# Patient Record
Sex: MALE | Race: WHITE | Employment: FULL TIME | URBAN - METROPOLITAN AREA
[De-identification: names, ages, dates, MRNs, and addresses within clinical notes are randomized per-mention and may not be internally consistent; named-entity substitution may affect disease eponyms.]

---

## 2023-08-14 RX ORDER — METOPROLOL SUCCINATE 25 MG/1
25 TABLET, EXTENDED RELEASE ORAL DAILY
Qty: 90 TABLET | Refills: 0 | Status: SHIPPED | OUTPATIENT
Start: 2023-08-14

## 2023-08-14 NOTE — TELEPHONE ENCOUNTER
Patient's wife called to request a refill for the patient. Patient has an appt scheduled for 9/1. Metoprolol -25 mg  1 x daily  [facility] in [address] [place] [address]    Thank you.

## 2023-08-29 SDOH — ECONOMIC STABILITY: TRANSPORTATION INSECURITY
IN THE PAST 12 MONTHS, HAS LACK OF TRANSPORTATION KEPT YOU FROM MEETINGS, WORK, OR FROM GETTING THINGS NEEDED FOR DAILY LIVING?: NO

## 2023-08-29 SDOH — ECONOMIC STABILITY: FOOD INSECURITY: WITHIN THE PAST 12 MONTHS, YOU WORRIED THAT YOUR FOOD WOULD RUN OUT BEFORE YOU GOT MONEY TO BUY MORE.: NEVER TRUE

## 2023-08-29 SDOH — ECONOMIC STABILITY: FOOD INSECURITY: WITHIN THE PAST 12 MONTHS, THE FOOD YOU BOUGHT JUST DIDN'T LAST AND YOU DIDN'T HAVE MONEY TO GET MORE.: NEVER TRUE

## 2023-08-29 SDOH — ECONOMIC STABILITY: INCOME INSECURITY: HOW HARD IS IT FOR YOU TO PAY FOR THE VERY BASICS LIKE FOOD, HOUSING, MEDICAL CARE, AND HEATING?: NOT HARD AT ALL

## 2023-08-29 SDOH — ECONOMIC STABILITY: HOUSING INSECURITY
IN THE LAST 12 MONTHS, WAS THERE A TIME WHEN YOU DID NOT HAVE A STEADY PLACE TO SLEEP OR SLEPT IN A SHELTER (INCLUDING NOW)?: NO

## 2023-08-29 ASSESSMENT — PATIENT HEALTH QUESTIONNAIRE - PHQ9
SUM OF ALL RESPONSES TO PHQ QUESTIONS 1-9: 0
2. FEELING DOWN, DEPRESSED OR HOPELESS: 0
SUM OF ALL RESPONSES TO PHQ QUESTIONS 1-9: 0
SUM OF ALL RESPONSES TO PHQ QUESTIONS 1-9: 0
1. LITTLE INTEREST OR PLEASURE IN DOING THINGS: NOT AT ALL
SUM OF ALL RESPONSES TO PHQ9 QUESTIONS 1 & 2: 0
SUM OF ALL RESPONSES TO PHQ9 QUESTIONS 1 & 2: 0
2. FEELING DOWN, DEPRESSED OR HOPELESS: NOT AT ALL
1. LITTLE INTEREST OR PLEASURE IN DOING THINGS: 0
SUM OF ALL RESPONSES TO PHQ QUESTIONS 1-9: 0

## 2023-09-01 ENCOUNTER — OFFICE VISIT (OUTPATIENT)
Dept: FAMILY MEDICINE CLINIC | Age: 63
End: 2023-09-01
Payer: COMMERCIAL

## 2023-09-01 VITALS
WEIGHT: 270.8 LBS | HEIGHT: 71 IN | DIASTOLIC BLOOD PRESSURE: 88 MMHG | BODY MASS INDEX: 37.91 KG/M2 | SYSTOLIC BLOOD PRESSURE: 136 MMHG

## 2023-09-01 DIAGNOSIS — I10 PRIMARY HYPERTENSION: ICD-10-CM

## 2023-09-01 DIAGNOSIS — J30.2 SEASONAL ALLERGIC RHINITIS, UNSPECIFIED TRIGGER: ICD-10-CM

## 2023-09-01 DIAGNOSIS — R53.82 CHRONIC FATIGUE: ICD-10-CM

## 2023-09-01 DIAGNOSIS — Z00.00 ENCOUNTER FOR ROUTINE ADULT HEALTH EXAMINATION WITHOUT ABNORMAL FINDINGS: Primary | ICD-10-CM

## 2023-09-01 DIAGNOSIS — F51.12 INSUFFICIENT SLEEP SYNDROME: ICD-10-CM

## 2023-09-01 DIAGNOSIS — M79.672 LEFT FOOT PAIN: ICD-10-CM

## 2023-09-01 PROCEDURE — 3075F SYST BP GE 130 - 139MM HG: CPT | Performed by: FAMILY MEDICINE

## 2023-09-01 PROCEDURE — 99396 PREV VISIT EST AGE 40-64: CPT | Performed by: FAMILY MEDICINE

## 2023-09-01 PROCEDURE — 3079F DIAST BP 80-89 MM HG: CPT | Performed by: FAMILY MEDICINE

## 2023-09-01 RX ORDER — MODAFINIL 200 MG/1
200 TABLET ORAL DAILY
Qty: 90 TABLET | Refills: 0 | Status: SHIPPED | OUTPATIENT
Start: 2023-09-01 | End: 2023-11-30

## 2023-09-01 RX ORDER — LANOLIN ALCOHOL/MO/W.PET/CERES
CREAM (GRAM) TOPICAL
COMMUNITY

## 2023-09-01 NOTE — PROGRESS NOTES
Normocephalic and atraumatic. Right Ear: Tympanic membrane normal.      Left Ear: Tympanic membrane normal.      Nose: Nose normal.      Mouth/Throat:      Mouth: Mucous membranes are dry. Eyes:      Extraocular Movements: Extraocular movements intact. Conjunctiva/sclera: Conjunctivae normal.      Pupils: Pupils are equal, round, and reactive to light. Cardiovascular:      Pulses: Normal pulses. Heart sounds: Normal heart sounds. Pulmonary:      Effort: Pulmonary effort is normal.      Breath sounds: Normal breath sounds. Abdominal:      General: Abdomen is flat. Bowel sounds are normal.      Palpations: Abdomen is soft. Musculoskeletal:         General: Normal range of motion. Cervical back: Normal range of motion and neck supple. Skin:     General: Skin is warm and dry. Neurological:      General: No focal deficit present. Mental Status: He is alert. Comments: He has some tenderness in the area of the neuromas. Psychiatric:         Mood and Affect: Mood normal.         Behavior: Behavior normal.         Thought Content: Thought content normal.         Judgment: Judgment normal.     Assessment & Plan   1. Encounter for routine adult health examination without abnormal findings  2. Primary hypertension  3. Seasonal allergic rhinitis, unspecified trigger  4. Left foot pain  -     Amb External Referral To Podiatry  5. Chronic fatigue  6. Insufficient sleep syndrome  -     modafinil (PROVIGIL) 200 MG tablet; Take 1 tablet by mouth daily for 90 days.  Max Daily Amount: 200 mg, Disp-90 tablet, R-0Normal     Orders Placed This Encounter   Procedures    Amb External Referral To Podiatry     Referral Priority:   Routine     Referral Type:   Consult for Advice and Opinion     Referred to Provider:   Yumiko Silva DPM     Requested Specialty:   PODIATRIST FOOT AND ANKLE SURGERY     Number of Visits Requested:   1     Orders Placed This Encounter   Medications    modafinil

## 2023-11-20 RX ORDER — METOPROLOL SUCCINATE 25 MG/1
25 TABLET, EXTENDED RELEASE ORAL DAILY
Qty: 90 TABLET | Refills: 0 | Status: SHIPPED | OUTPATIENT
Start: 2023-11-20

## 2023-11-20 NOTE — TELEPHONE ENCOUNTER
Patient requesting medication refill. Please approve or deny this request.    Rx requested:  Requested Prescriptions     Pending Prescriptions Disp Refills    metoprolol succinate (TOPROL XL) 25 MG extended release tablet 90 tablet 0     Sig: Take 1 tablet by mouth daily         Last Office Visit:   9/1/2023      Next Visit Date:  No future appointments.

## 2024-01-31 ENCOUNTER — OFFICE VISIT (OUTPATIENT)
Dept: FAMILY MEDICINE CLINIC | Age: 64
End: 2024-01-31
Payer: COMMERCIAL

## 2024-01-31 VITALS
WEIGHT: 270 LBS | TEMPERATURE: 97.6 F | SYSTOLIC BLOOD PRESSURE: 138 MMHG | HEART RATE: 70 BPM | OXYGEN SATURATION: 99 % | DIASTOLIC BLOOD PRESSURE: 88 MMHG | HEIGHT: 71 IN | BODY MASS INDEX: 37.8 KG/M2

## 2024-01-31 DIAGNOSIS — I10 PRIMARY HYPERTENSION: Primary | ICD-10-CM

## 2024-01-31 DIAGNOSIS — J40 BRONCHITIS: ICD-10-CM

## 2024-01-31 PROCEDURE — G8427 DOCREV CUR MEDS BY ELIG CLIN: HCPCS | Performed by: FAMILY MEDICINE

## 2024-01-31 PROCEDURE — 99213 OFFICE O/P EST LOW 20 MIN: CPT | Performed by: FAMILY MEDICINE

## 2024-01-31 PROCEDURE — 3075F SYST BP GE 130 - 139MM HG: CPT | Performed by: FAMILY MEDICINE

## 2024-01-31 PROCEDURE — 3079F DIAST BP 80-89 MM HG: CPT | Performed by: FAMILY MEDICINE

## 2024-01-31 PROCEDURE — G8417 CALC BMI ABV UP PARAM F/U: HCPCS | Performed by: FAMILY MEDICINE

## 2024-01-31 PROCEDURE — G8484 FLU IMMUNIZE NO ADMIN: HCPCS | Performed by: FAMILY MEDICINE

## 2024-01-31 PROCEDURE — 3017F COLORECTAL CA SCREEN DOC REV: CPT | Performed by: FAMILY MEDICINE

## 2024-01-31 PROCEDURE — 1036F TOBACCO NON-USER: CPT | Performed by: FAMILY MEDICINE

## 2024-01-31 RX ORDER — DOXYCYCLINE HYCLATE 100 MG
100 TABLET ORAL 2 TIMES DAILY
Qty: 20 TABLET | Refills: 0 | Status: SHIPPED | OUTPATIENT
Start: 2024-01-31 | End: 2024-02-10

## 2024-01-31 RX ORDER — METHYLPREDNISOLONE 4 MG/1
TABLET ORAL
Qty: 1 KIT | Refills: 0 | Status: SHIPPED | OUTPATIENT
Start: 2024-01-31

## 2024-01-31 RX ORDER — LISINOPRIL 5 MG/1
5 TABLET ORAL DAILY
Qty: 30 TABLET | Refills: 5 | Status: SHIPPED | OUTPATIENT
Start: 2024-01-31

## 2024-01-31 ASSESSMENT — PATIENT HEALTH QUESTIONNAIRE - PHQ9
SUM OF ALL RESPONSES TO PHQ QUESTIONS 1-9: 0
SUM OF ALL RESPONSES TO PHQ9 QUESTIONS 1 & 2: 0
2. FEELING DOWN, DEPRESSED OR HOPELESS: 0
SUM OF ALL RESPONSES TO PHQ QUESTIONS 1-9: 0
1. LITTLE INTEREST OR PLEASURE IN DOING THINGS: 0
SUM OF ALL RESPONSES TO PHQ QUESTIONS 1-9: 0
SUM OF ALL RESPONSES TO PHQ QUESTIONS 1-9: 0

## 2024-01-31 ASSESSMENT — ENCOUNTER SYMPTOMS: COUGH: 1

## 2024-01-31 NOTE — PROGRESS NOTES
Subjective  Cade Ham 1960 is a 63 y.o. male who presents today with:  Chief Complaint   Patient presents with    Hypertension     Reports his BP has been elevated at home, averaging about 150/90. Denies chest pains, palpitations, SOB, dizziness, lightheadedness, headaches. Does have some swelling in his lower legs and feet. Currently taking metoprolol 25 mg once daily as directed, without adverse effects.     Cough     Reports he had COVID about 1 month ago and has lingering C/O sinus congestion, drainage, and hoarse voice. Denies fevers, chills, fatigue, body aches, headaches, facial pain, ear pain, sore throat, cough, SOB, wheezing, nausea, vomiting, or diarrhea. He has been taking OTC cold and flu medication for relief, which has helped give him temporary relief.      Other     C/O having to attempt to clear his throat often, but is unable to do so. Reports this has been ongoing for several years, but has gotten worse since COVID and is now feels like he cannot clear the mucus that is caught in his throat unless he consumes ice cream or gravy. Hx lymphoma. Denies feeling any lumps or swollen lymph nodes in/around his throat. Also has a hx of GERD. He takes Nexium only PRN, 2-3 times monthly. Denies coughing.     Hip Pain     C/O left hip pain. This has been a concern for a few months. Denies back or leg pain. He lays on his left hip at night and the pain will wake him up out of his sleep.        Hypertension    Cough    Hip Pain       I have reviewed HPI and agree with above.     Review of Systems   Respiratory:  Positive for cough.    All other systems reviewed and are negative.      Past Medical History:   Diagnosis Date    Cancer (HCC) 2021    Hypertension     Obesity      Past Surgical History:   Procedure Laterality Date    TONSILLECTOMY      UPPER GASTROINTESTINAL ENDOSCOPY       Social History     Socioeconomic History    Marital status:      Spouse name: Not on file    Number of

## 2024-02-12 ENCOUNTER — OFFICE VISIT (OUTPATIENT)
Dept: FAMILY MEDICINE CLINIC | Age: 64
End: 2024-02-12
Payer: COMMERCIAL

## 2024-02-12 VITALS
DIASTOLIC BLOOD PRESSURE: 80 MMHG | HEART RATE: 64 BPM | HEIGHT: 71 IN | BODY MASS INDEX: 39.23 KG/M2 | OXYGEN SATURATION: 98 % | TEMPERATURE: 97.6 F | SYSTOLIC BLOOD PRESSURE: 132 MMHG | WEIGHT: 280.2 LBS

## 2024-02-12 DIAGNOSIS — J30.89 NON-SEASONAL ALLERGIC RHINITIS DUE TO OTHER ALLERGIC TRIGGER: Primary | ICD-10-CM

## 2024-02-12 PROCEDURE — G8427 DOCREV CUR MEDS BY ELIG CLIN: HCPCS | Performed by: FAMILY MEDICINE

## 2024-02-12 PROCEDURE — 1036F TOBACCO NON-USER: CPT | Performed by: FAMILY MEDICINE

## 2024-02-12 PROCEDURE — 99213 OFFICE O/P EST LOW 20 MIN: CPT | Performed by: FAMILY MEDICINE

## 2024-02-12 PROCEDURE — G8417 CALC BMI ABV UP PARAM F/U: HCPCS | Performed by: FAMILY MEDICINE

## 2024-02-12 PROCEDURE — 96372 THER/PROPH/DIAG INJ SC/IM: CPT | Performed by: FAMILY MEDICINE

## 2024-02-12 PROCEDURE — G8484 FLU IMMUNIZE NO ADMIN: HCPCS | Performed by: FAMILY MEDICINE

## 2024-02-12 PROCEDURE — 3017F COLORECTAL CA SCREEN DOC REV: CPT | Performed by: FAMILY MEDICINE

## 2024-02-12 RX ORDER — TRIAMCINOLONE ACETONIDE 40 MG/ML
80 INJECTION, SUSPENSION INTRA-ARTICULAR; INTRAMUSCULAR ONCE
Status: COMPLETED | OUTPATIENT
Start: 2024-02-12 | End: 2024-02-12

## 2024-02-12 RX ADMIN — TRIAMCINOLONE ACETONIDE 80 MG: 40 INJECTION, SUSPENSION INTRA-ARTICULAR; INTRAMUSCULAR at 16:47

## 2024-02-12 NOTE — PROGRESS NOTES
\"LABA1C\"  No results found for: \"CREATININE\"  No results found for: \"ALT\", \"AST\"  No results found for: \"CHOL\", \"TRIG\", \"HDL\", \"LDLCALC\", \"LDLDIRECT\"       Physical Exam  Constitutional:       Appearance: Normal appearance.   Neurological:      Mental Status: He is alert.       Assessment & Plan   1. Non-seasonal allergic rhinitis due to other allergic trigger  -     triamcinolone acetonide (KENALOG-40) injection 80 mg; 80 mg, IntraMUSCular, ONCE, 1 dose, On Mon 2/12/24 at 1715  I suspect most of his problems are allergy mediated.  We will see how his responses to a Kenalog shot.  If the response is limited we will send him to allergy for testing  No orders of the defined types were placed in this encounter.    Orders Placed This Encounter   Medications    triamcinolone acetonide (KENALOG-40) injection 80 mg     Medications Discontinued During This Encounter   Medication Reason    methylPREDNISolone (MEDROL DOSEPACK) 4 MG tablet Therapy completed     No follow-ups on file.        Reviewed with the patient: current clinical status, medications, activities and diet.     Side effects, adverse effects of the medication prescribed today, as well as treatment plan/ rationale and result expectations have been discussed with the patient who expresses understanding and desires to proceed.    Close follow up to evaluate treatment results and for coordination of care.  I have reviewed the patient's medical history in detail and updated the computerized patient record.    JOEL CARPIO DO

## 2024-02-13 RX ORDER — METOPROLOL SUCCINATE 25 MG/1
25 TABLET, EXTENDED RELEASE ORAL DAILY
Qty: 90 TABLET | Refills: 0 | Status: SHIPPED | OUTPATIENT
Start: 2024-02-13

## 2024-02-13 NOTE — TELEPHONE ENCOUNTER
Future Appointments    This patient does not currently have any appointments scheduled.  Past Visits    Date Provider Specialty Visit Type Primary Dx   02/12/2024 Gerald Gamboa, DO Family Medicine Office Visit Non-seasonal allergic rhinitis due to other allergic trigger

## 2024-04-29 RX ORDER — METOPROLOL SUCCINATE 25 MG/1
25 TABLET, EXTENDED RELEASE ORAL DAILY
Qty: 90 TABLET | Refills: 0 | Status: SHIPPED | OUTPATIENT
Start: 2024-04-29

## 2024-04-29 NOTE — TELEPHONE ENCOUNTER
Please approve or deny request. Thank you!    Rx requested:  Requested Prescriptions     Pending Prescriptions Disp Refills    metoprolol succinate (TOPROL XL) 25 MG extended release tablet 90 tablet 0     Sig: Take 1 tablet by mouth daily         Last Office Visit:   2/12/2024      Next Visit Date:  No future appointments.

## 2024-06-06 ENCOUNTER — OFFICE VISIT (OUTPATIENT)
Dept: FAMILY MEDICINE CLINIC | Age: 64
End: 2024-06-06
Payer: COMMERCIAL

## 2024-06-06 VITALS
TEMPERATURE: 97.5 F | HEIGHT: 71 IN | WEIGHT: 278 LBS | OXYGEN SATURATION: 96 % | SYSTOLIC BLOOD PRESSURE: 136 MMHG | DIASTOLIC BLOOD PRESSURE: 84 MMHG | BODY MASS INDEX: 38.92 KG/M2 | HEART RATE: 67 BPM

## 2024-06-06 DIAGNOSIS — K43.9 ABDOMINAL WALL HERNIA: ICD-10-CM

## 2024-06-06 DIAGNOSIS — T81.89XD NON-HEALING SURGICAL WOUND, SUBSEQUENT ENCOUNTER: ICD-10-CM

## 2024-06-06 PROCEDURE — G8417 CALC BMI ABV UP PARAM F/U: HCPCS | Performed by: FAMILY MEDICINE

## 2024-06-06 PROCEDURE — G8427 DOCREV CUR MEDS BY ELIG CLIN: HCPCS | Performed by: FAMILY MEDICINE

## 2024-06-06 PROCEDURE — 3017F COLORECTAL CA SCREEN DOC REV: CPT | Performed by: FAMILY MEDICINE

## 2024-06-06 PROCEDURE — 1036F TOBACCO NON-USER: CPT | Performed by: FAMILY MEDICINE

## 2024-06-06 PROCEDURE — 99213 OFFICE O/P EST LOW 20 MIN: CPT | Performed by: FAMILY MEDICINE

## 2024-06-06 RX ORDER — PHENTERMINE HYDROCHLORIDE 37.5 MG/1
37.5 TABLET ORAL
Qty: 30 TABLET | Refills: 0 | Status: SHIPPED | OUTPATIENT
Start: 2024-06-06 | End: 2024-07-06

## 2024-06-06 ASSESSMENT — ENCOUNTER SYMPTOMS: ABDOMINAL PAIN: 1

## 2024-06-06 NOTE — PROGRESS NOTES
Social Determinants of Health     Financial Resource Strain: Low Risk  (8/29/2023)    Overall Financial Resource Strain (CARDIA)     Difficulty of Paying Living Expenses: Not hard at all   Food Insecurity: Not on file (8/29/2023)   Transportation Needs: Unknown (8/29/2023)    PRAPARE - Transportation     Lack of Transportation (Medical): Not on file     Lack of Transportation (Non-Medical): No   Physical Activity: Not on file   Stress: Not on file   Social Connections: Not on file   Intimate Partner Violence: Not on file   Housing Stability: Unknown (8/29/2023)    Housing Stability Vital Sign     Unable to Pay for Housing in the Last Year: Not on file     Number of Places Lived in the Last Year: Not on file     Unstable Housing in the Last Year: No     Family History   Problem Relation Age of Onset    High Blood Pressure Mother     High Cholesterol Mother     Cancer Father     Mental Illness Father     Obesity Paternal Grandfather     Obesity Paternal Grandmother      Allergies   Allergen Reactions    Adhesive Tape Rash    Cephalosporins Rash    Heparin Rash     Current Outpatient Medications   Medication Sig Dispense Refill    mupirocin (BACTROBAN) 2 % ointment Apply to wound daily until healed      phentermine (ADIPEX-P) 37.5 MG tablet Take 1 tablet by mouth every morning (before breakfast) for 30 days. Max Daily Amount: 37.5 mg 30 tablet 0    metoprolol succinate (TOPROL XL) 25 MG extended release tablet Take 1 tablet by mouth daily 90 tablet 0    lisinopril (PRINIVIL;ZESTRIL) 5 MG tablet Take 1 tablet by mouth daily 30 tablet 5    esomeprazole (NEXIUM) 20 MG delayed release capsule       Cyanocobalamin (VITAMIN B12) 500 MCG TABS        No current facility-administered medications for this visit.       PMH, Surgical Hx, Family Hx, and Social Hx reviewed and updated.  Health Maintenance reviewed.    Objective  Vitals:    06/06/24 1313   BP: 136/84   Pulse: 67   Temp: 97.5 °F (36.4 °C)   TempSrc: Temporal

## 2024-06-25 ENCOUNTER — LAB REQUISITION (OUTPATIENT)
Dept: LAB | Facility: HOSPITAL | Age: 64
End: 2024-06-25
Payer: COMMERCIAL

## 2024-06-25 ENCOUNTER — OFFICE VISIT (OUTPATIENT)
Dept: WOUND CARE | Facility: CLINIC | Age: 64
End: 2024-06-25
Payer: COMMERCIAL
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
## 2024-06-25 DIAGNOSIS — T81.31XA DISRUPTION OF EXTERNAL OPERATION (SURGICAL) WOUND, NOT ELSEWHERE CLASSIFIED, INITIAL ENCOUNTER: ICD-10-CM

## 2024-06-25 DIAGNOSIS — L98.492 NON-PRESSURE CHRONIC ULCER OF SKIN OF OTHER SITES WITH FAT LAYER EXPOSED (MULTI): ICD-10-CM

## 2024-06-25 PROCEDURE — 99213 OFFICE O/P EST LOW 20 MIN: CPT | Mod: 25

## 2024-06-25 PROCEDURE — 99213 OFFICE O/P EST LOW 20 MIN: CPT | Performed by: SURGERY

## 2024-06-25 PROCEDURE — 87070 CULTURE OTHR SPECIMN AEROBIC: CPT | Mod: OUT,ELYLAB | Performed by: SURGERY

## 2024-06-25 PROCEDURE — 11042 DBRDMT SUBQ TIS 1ST 20SQCM/<: CPT

## 2024-06-25 PROCEDURE — 11042 DBRDMT SUBQ TIS 1ST 20SQCM/<: CPT | Performed by: SURGERY

## 2024-06-28 LAB
B-LACTAMASE ORGANISM ISLT: POSITIVE
BACTERIA SPEC CULT: ABNORMAL
BACTERIA SPEC CULT: ABNORMAL
GRAM STN SPEC: ABNORMAL
GRAM STN SPEC: ABNORMAL

## 2024-07-01 ENCOUNTER — TELEPHONE (OUTPATIENT)
Dept: FAMILY MEDICINE CLINIC | Age: 64
End: 2024-07-01

## 2024-07-01 NOTE — TELEPHONE ENCOUNTER
Flaca from [facility] called and stated that patient is being seen by  wound clinic.  She stated she is going out Monday, Wednesday and Friday to change the bandage for the wound vac.

## 2024-07-02 ENCOUNTER — LAB (OUTPATIENT)
Dept: LAB | Facility: LAB | Age: 64
End: 2024-07-02
Payer: COMMERCIAL

## 2024-07-02 ENCOUNTER — OFFICE VISIT (OUTPATIENT)
Dept: WOUND CARE | Facility: CLINIC | Age: 64
End: 2024-07-02
Payer: COMMERCIAL
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
## 2024-07-02 LAB
CREAT SERPL-MCNC: 0.99 MG/DL (ref 0.5–1.3)
EGFRCR SERPLBLD CKD-EPI 2021: 86 ML/MIN/1.73M*2

## 2024-07-02 PROCEDURE — 11042 DBRDMT SUBQ TIS 1ST 20SQCM/<: CPT

## 2024-07-02 PROCEDURE — 11042 DBRDMT SUBQ TIS 1ST 20SQCM/<: CPT | Performed by: SURGERY

## 2024-07-09 ENCOUNTER — OFFICE VISIT (OUTPATIENT)
Dept: WOUND CARE | Facility: CLINIC | Age: 64
End: 2024-07-09
Payer: COMMERCIAL

## 2024-07-09 ENCOUNTER — HOSPITAL ENCOUNTER (OUTPATIENT)
Dept: RADIOLOGY | Facility: HOSPITAL | Age: 64
Discharge: HOME | End: 2024-07-09
Payer: COMMERCIAL

## 2024-07-09 DIAGNOSIS — L98.492 NON-PRESSURE CHRONIC ULCER OF SKIN OF OTHER SITES WITH FAT LAYER EXPOSED (MULTI): ICD-10-CM

## 2024-07-09 DIAGNOSIS — T81.31XA DISRUPTION OF EXTERNAL OPERATION (SURGICAL) WOUND, NOT ELSEWHERE CLASSIFIED, INITIAL ENCOUNTER: ICD-10-CM

## 2024-07-09 PROCEDURE — 2550000001 HC RX 255 CONTRASTS: Performed by: SURGERY

## 2024-07-09 PROCEDURE — 74177 CT ABD & PELVIS W/CONTRAST: CPT | Performed by: RADIOLOGY

## 2024-07-09 PROCEDURE — 71260 CT THORAX DX C+: CPT | Performed by: RADIOLOGY

## 2024-07-09 PROCEDURE — A9698 NON-RAD CONTRAST MATERIALNOC: HCPCS | Performed by: SURGERY

## 2024-07-09 PROCEDURE — 11042 DBRDMT SUBQ TIS 1ST 20SQCM/<: CPT

## 2024-07-09 PROCEDURE — 11042 DBRDMT SUBQ TIS 1ST 20SQCM/<: CPT | Performed by: SURGERY

## 2024-07-09 PROCEDURE — 71260 CT THORAX DX C+: CPT

## 2024-07-15 ENCOUNTER — OFFICE VISIT (OUTPATIENT)
Dept: FAMILY MEDICINE CLINIC | Age: 64
End: 2024-07-15
Payer: COMMERCIAL

## 2024-07-15 VITALS
HEIGHT: 71 IN | BODY MASS INDEX: 38.05 KG/M2 | HEART RATE: 58 BPM | DIASTOLIC BLOOD PRESSURE: 70 MMHG | OXYGEN SATURATION: 98 % | TEMPERATURE: 97.7 F | WEIGHT: 271.8 LBS | SYSTOLIC BLOOD PRESSURE: 122 MMHG

## 2024-07-15 DIAGNOSIS — R39.11 URINARY HESITANCY: ICD-10-CM

## 2024-07-15 DIAGNOSIS — T81.89XD NON-HEALING SURGICAL WOUND, SUBSEQUENT ENCOUNTER: ICD-10-CM

## 2024-07-15 PROCEDURE — G8417 CALC BMI ABV UP PARAM F/U: HCPCS | Performed by: FAMILY MEDICINE

## 2024-07-15 PROCEDURE — 99213 OFFICE O/P EST LOW 20 MIN: CPT | Performed by: FAMILY MEDICINE

## 2024-07-15 PROCEDURE — 1036F TOBACCO NON-USER: CPT | Performed by: FAMILY MEDICINE

## 2024-07-15 PROCEDURE — 3017F COLORECTAL CA SCREEN DOC REV: CPT | Performed by: FAMILY MEDICINE

## 2024-07-15 PROCEDURE — G8427 DOCREV CUR MEDS BY ELIG CLIN: HCPCS | Performed by: FAMILY MEDICINE

## 2024-07-15 RX ORDER — TAMSULOSIN HYDROCHLORIDE 0.4 MG/1
0.4 CAPSULE ORAL DAILY
Qty: 30 CAPSULE | Refills: 2 | Status: SHIPPED | OUTPATIENT
Start: 2024-07-15

## 2024-07-15 RX ORDER — TAMSULOSIN HYDROCHLORIDE 0.4 MG/1
0.4 CAPSULE ORAL DAILY
COMMUNITY
End: 2024-07-15 | Stop reason: SDUPTHER

## 2024-07-15 RX ORDER — PHENTERMINE HYDROCHLORIDE 37.5 MG/1
37.5 TABLET ORAL
Qty: 30 TABLET | Refills: 0 | Status: SHIPPED | OUTPATIENT
Start: 2024-07-15 | End: 2024-08-14

## 2024-07-15 NOTE — PROGRESS NOTES
37.5 mg     Dispense:  30 tablet     Refill:  0    tamsulosin (FLOMAX) 0.4 MG capsule     Sig: Take 1 capsule by mouth daily     Dispense:  30 capsule     Refill:  2     Medications Discontinued During This Encounter   Medication Reason    mupirocin (BACTROBAN) 2 % ointment LIST CLEANUP    tamsulosin (FLOMAX) 0.4 MG capsule REORDER     No follow-ups on file.        Reviewed with the patient: current clinical status, medications, activities and diet.     Side effects, adverse effects of the medication prescribed today, as well as treatment plan/ rationale and result expectations have been discussed with the patient who expresses understanding and desires to proceed.    Close follow up to evaluate treatment results and for coordination of care.  I have reviewed the patient's medical history in detail and updated the computerized patient record.    JOEL CARPIO, DO

## 2024-07-16 ENCOUNTER — OFFICE VISIT (OUTPATIENT)
Dept: WOUND CARE | Facility: CLINIC | Age: 64
End: 2024-07-16
Payer: COMMERCIAL

## 2024-07-16 PROCEDURE — 11042 DBRDMT SUBQ TIS 1ST 20SQCM/<: CPT | Performed by: SURGERY

## 2024-07-16 PROCEDURE — 11042 DBRDMT SUBQ TIS 1ST 20SQCM/<: CPT

## 2024-07-21 DIAGNOSIS — I10 PRIMARY HYPERTENSION: ICD-10-CM

## 2024-07-22 RX ORDER — LISINOPRIL 5 MG/1
5 TABLET ORAL DAILY
Qty: 90 TABLET | Refills: 1 | Status: SHIPPED | OUTPATIENT
Start: 2024-07-22

## 2024-07-22 NOTE — TELEPHONE ENCOUNTER
Future Appointments    Encounter Information   Provider Department Appt Notes   8/15/2024 Gerald Gamboa,  [facility] 1 month follow up     Past Visits    Date Provider Specialty Visit Type Primary Dx   07/15/2024 Gerald Gamboa,  Family Medicine Office Visit BMI 37.0-37.9, adult

## 2024-07-23 ENCOUNTER — OFFICE VISIT (OUTPATIENT)
Dept: WOUND CARE | Facility: CLINIC | Age: 64
End: 2024-07-23
Payer: COMMERCIAL

## 2024-07-23 PROCEDURE — 11042 DBRDMT SUBQ TIS 1ST 20SQCM/<: CPT

## 2024-07-23 PROCEDURE — 11042 DBRDMT SUBQ TIS 1ST 20SQCM/<: CPT | Performed by: SURGERY

## 2024-07-29 RX ORDER — METOPROLOL SUCCINATE 25 MG/1
25 TABLET, EXTENDED RELEASE ORAL DAILY
Qty: 90 TABLET | Refills: 1 | Status: SHIPPED | OUTPATIENT
Start: 2024-07-29

## 2024-07-29 NOTE — TELEPHONE ENCOUNTER
Future Appointments    Encounter Information   Provider Department Appt Notes   8/15/2024 Gerald Gamboa DO [facility] 1 month follow up     Past Visits    Date Provider Specialty Visit Type Primary Dx   07/15/2024 Gerald Gamboa,  Family Medicine Office Visit BMI 37.0-37.9, adul

## 2024-07-30 ENCOUNTER — OFFICE VISIT (OUTPATIENT)
Dept: WOUND CARE | Facility: CLINIC | Age: 64
End: 2024-07-30
Payer: COMMERCIAL

## 2024-07-30 PROCEDURE — 11042 DBRDMT SUBQ TIS 1ST 20SQCM/<: CPT

## 2024-07-30 PROCEDURE — 11042 DBRDMT SUBQ TIS 1ST 20SQCM/<: CPT | Performed by: SURGERY

## 2024-08-06 ENCOUNTER — OFFICE VISIT (OUTPATIENT)
Dept: WOUND CARE | Facility: CLINIC | Age: 64
End: 2024-08-06
Payer: COMMERCIAL

## 2024-08-06 PROCEDURE — 11042 DBRDMT SUBQ TIS 1ST 20SQCM/<: CPT | Performed by: SURGERY

## 2024-08-06 PROCEDURE — 11045 DBRDMT SUBQ TISS EACH ADDL: CPT | Performed by: SURGERY

## 2024-08-06 PROCEDURE — 11045 DBRDMT SUBQ TISS EACH ADDL: CPT

## 2024-08-06 PROCEDURE — 11042 DBRDMT SUBQ TIS 1ST 20SQCM/<: CPT

## 2024-08-11 DIAGNOSIS — R39.11 URINARY HESITANCY: ICD-10-CM

## 2024-08-12 RX ORDER — TAMSULOSIN HYDROCHLORIDE 0.4 MG/1
0.4 CAPSULE ORAL DAILY
Qty: 90 CAPSULE | Refills: 3 | Status: SHIPPED | OUTPATIENT
Start: 2024-08-12

## 2024-08-12 RX ORDER — PHENTERMINE HYDROCHLORIDE 37.5 MG/1
37.5 TABLET ORAL
Qty: 30 TABLET | Refills: 0 | Status: SHIPPED | OUTPATIENT
Start: 2024-08-12 | End: 2024-09-11

## 2024-08-13 ENCOUNTER — OFFICE VISIT (OUTPATIENT)
Dept: WOUND CARE | Facility: CLINIC | Age: 64
End: 2024-08-13
Payer: COMMERCIAL

## 2024-08-13 PROCEDURE — 11042 DBRDMT SUBQ TIS 1ST 20SQCM/<: CPT | Performed by: SURGERY

## 2024-08-13 PROCEDURE — 11042 DBRDMT SUBQ TIS 1ST 20SQCM/<: CPT

## 2024-08-13 PROCEDURE — 11045 DBRDMT SUBQ TISS EACH ADDL: CPT | Performed by: SURGERY

## 2024-08-13 PROCEDURE — 11045 DBRDMT SUBQ TISS EACH ADDL: CPT

## 2024-08-15 ENCOUNTER — OFFICE VISIT (OUTPATIENT)
Dept: FAMILY MEDICINE CLINIC | Age: 64
End: 2024-08-15
Payer: COMMERCIAL

## 2024-08-15 VITALS
WEIGHT: 266.6 LBS | DIASTOLIC BLOOD PRESSURE: 76 MMHG | HEART RATE: 67 BPM | SYSTOLIC BLOOD PRESSURE: 122 MMHG | TEMPERATURE: 97.6 F | BODY MASS INDEX: 37.18 KG/M2 | OXYGEN SATURATION: 97 %

## 2024-08-15 DIAGNOSIS — T81.89XD NON-HEALING SURGICAL WOUND, SUBSEQUENT ENCOUNTER: ICD-10-CM

## 2024-08-15 PROCEDURE — G8417 CALC BMI ABV UP PARAM F/U: HCPCS | Performed by: FAMILY MEDICINE

## 2024-08-15 PROCEDURE — 99213 OFFICE O/P EST LOW 20 MIN: CPT | Performed by: FAMILY MEDICINE

## 2024-08-15 PROCEDURE — 1036F TOBACCO NON-USER: CPT | Performed by: FAMILY MEDICINE

## 2024-08-15 PROCEDURE — G8427 DOCREV CUR MEDS BY ELIG CLIN: HCPCS | Performed by: FAMILY MEDICINE

## 2024-08-15 PROCEDURE — 3017F COLORECTAL CA SCREEN DOC REV: CPT | Performed by: FAMILY MEDICINE

## 2024-08-15 NOTE — PROGRESS NOTES
Subjective  Cade Ham 1960 is a 64 y.o. male who presents today with:  Chief Complaint   Patient presents with    Weight Management     1 month follow up. This will start his third month on Adipex. Denies heart palpitations, chest pains, headaches, constipation, or trouble sleeping. He is down 5lbs since last visit and 12lbs total since starting the medication.     Wound Infection     He would like to discuss a second opinion on his wound.       Weight Management      I have reviewed HPI and agree with above.     Review of Systems   All other systems reviewed and are negative.      Past Medical History:   Diagnosis Date    Cancer (HCC) 2021    Hypertension     Obesity      Past Surgical History:   Procedure Laterality Date    TONSILLECTOMY      UPPER GASTROINTESTINAL ENDOSCOPY       Social History     Socioeconomic History    Marital status:      Spouse name: Not on file    Number of children: Not on file    Years of education: Not on file    Highest education level: Not on file   Occupational History    Not on file   Tobacco Use    Smoking status: Never    Smokeless tobacco: Never   Vaping Use    Vaping status: Never Used   Substance and Sexual Activity    Alcohol use: Yes     Alcohol/week: 1.0 standard drink of alcohol     Types: 1 Cans of beer per week     Comment: Week    Drug use: Never    Sexual activity: Yes     Partners: Female   Other Topics Concern    Not on file   Social History Narrative    Not on file     Social Determinants of Health     Financial Resource Strain: Low Risk  (8/29/2023)    Overall Financial Resource Strain (CARDIA)     Difficulty of Paying Living Expenses: Not hard at all   Food Insecurity: Not on file (8/29/2023)   Transportation Needs: Unknown (8/29/2023)    PRAPARE - Transportation     Lack of Transportation (Medical): Not on file     Lack of Transportation (Non-Medical): No   Physical Activity: Not on file   Stress: Not on file   Social Connections: Not on file

## 2024-08-27 ENCOUNTER — OFFICE VISIT (OUTPATIENT)
Dept: WOUND CARE | Facility: CLINIC | Age: 64
End: 2024-08-27
Payer: COMMERCIAL

## 2024-08-27 PROCEDURE — 11045 DBRDMT SUBQ TISS EACH ADDL: CPT

## 2024-08-27 PROCEDURE — 11042 DBRDMT SUBQ TIS 1ST 20SQCM/<: CPT | Performed by: SURGERY

## 2024-08-27 PROCEDURE — 11045 DBRDMT SUBQ TISS EACH ADDL: CPT | Performed by: SURGERY

## 2024-08-27 PROCEDURE — 11042 DBRDMT SUBQ TIS 1ST 20SQCM/<: CPT

## 2024-09-03 ENCOUNTER — OFFICE VISIT (OUTPATIENT)
Dept: WOUND CARE | Facility: CLINIC | Age: 64
End: 2024-09-03
Payer: COMMERCIAL

## 2024-09-03 PROCEDURE — 11045 DBRDMT SUBQ TISS EACH ADDL: CPT

## 2024-09-03 PROCEDURE — 11042 DBRDMT SUBQ TIS 1ST 20SQCM/<: CPT | Performed by: SURGERY

## 2024-09-03 PROCEDURE — 11042 DBRDMT SUBQ TIS 1ST 20SQCM/<: CPT

## 2024-09-03 PROCEDURE — 11045 DBRDMT SUBQ TISS EACH ADDL: CPT | Performed by: SURGERY

## 2024-09-10 ENCOUNTER — OFFICE VISIT (OUTPATIENT)
Dept: WOUND CARE | Facility: CLINIC | Age: 64
End: 2024-09-10
Payer: COMMERCIAL

## 2024-09-10 PROCEDURE — 11045 DBRDMT SUBQ TISS EACH ADDL: CPT | Performed by: SURGERY

## 2024-09-10 PROCEDURE — 11045 DBRDMT SUBQ TISS EACH ADDL: CPT

## 2024-09-10 PROCEDURE — 11042 DBRDMT SUBQ TIS 1ST 20SQCM/<: CPT

## 2024-09-10 PROCEDURE — 11042 DBRDMT SUBQ TIS 1ST 20SQCM/<: CPT | Performed by: SURGERY

## 2024-09-24 ENCOUNTER — OFFICE VISIT (OUTPATIENT)
Dept: WOUND CARE | Facility: CLINIC | Age: 64
End: 2024-09-24
Payer: COMMERCIAL

## 2024-09-24 PROCEDURE — 11042 DBRDMT SUBQ TIS 1ST 20SQCM/<: CPT

## 2024-09-24 PROCEDURE — 11042 DBRDMT SUBQ TIS 1ST 20SQCM/<: CPT | Performed by: SURGERY

## 2024-09-24 PROCEDURE — 11045 DBRDMT SUBQ TISS EACH ADDL: CPT

## 2024-10-01 ENCOUNTER — OFFICE VISIT (OUTPATIENT)
Dept: WOUND CARE | Facility: CLINIC | Age: 64
End: 2024-10-01
Payer: COMMERCIAL

## 2024-10-01 PROCEDURE — 11045 DBRDMT SUBQ TISS EACH ADDL: CPT

## 2024-10-01 PROCEDURE — 11042 DBRDMT SUBQ TIS 1ST 20SQCM/<: CPT

## 2024-10-01 PROCEDURE — 11042 DBRDMT SUBQ TIS 1ST 20SQCM/<: CPT | Performed by: SURGERY

## 2024-10-03 ENCOUNTER — PATIENT MESSAGE (OUTPATIENT)
Dept: FAMILY MEDICINE CLINIC | Age: 64
End: 2024-10-03

## 2024-10-03 DIAGNOSIS — R10.9 RECURRING ABDOMINAL PAIN: Primary | ICD-10-CM

## 2024-10-08 ENCOUNTER — OFFICE VISIT (OUTPATIENT)
Dept: WOUND CARE | Facility: CLINIC | Age: 64
End: 2024-10-08
Payer: COMMERCIAL

## 2024-10-08 PROCEDURE — 11042 DBRDMT SUBQ TIS 1ST 20SQCM/<: CPT

## 2024-10-08 PROCEDURE — 11045 DBRDMT SUBQ TISS EACH ADDL: CPT | Performed by: SURGERY

## 2024-10-08 PROCEDURE — 11042 DBRDMT SUBQ TIS 1ST 20SQCM/<: CPT | Performed by: SURGERY

## 2024-10-08 PROCEDURE — 11045 DBRDMT SUBQ TISS EACH ADDL: CPT

## 2024-10-15 ENCOUNTER — OFFICE VISIT (OUTPATIENT)
Dept: WOUND CARE | Facility: CLINIC | Age: 64
End: 2024-10-15
Payer: COMMERCIAL

## 2024-10-15 PROCEDURE — 11042 DBRDMT SUBQ TIS 1ST 20SQCM/<: CPT

## 2024-10-15 PROCEDURE — 11045 DBRDMT SUBQ TISS EACH ADDL: CPT

## 2024-10-16 RX ORDER — PHENTERMINE HYDROCHLORIDE 37.5 MG/1
37.5 TABLET ORAL
Qty: 30 TABLET | Refills: 0 | OUTPATIENT
Start: 2024-10-16 | End: 2024-11-15

## 2024-10-16 NOTE — TELEPHONE ENCOUNTER
Future Appointments    Encounter Information   Provider Department Appt Notes   11/15/2024 Gerald Gamboa,  [facility] 3 month follow up     Past Visits    Date Provider Specialty Visit Type Primary Dx   08/15/2024 Gerald Gamboa,  Family Medicine Office Visit BMI 37.0-37.9, adult

## 2024-10-22 ENCOUNTER — OFFICE VISIT (OUTPATIENT)
Dept: WOUND CARE | Facility: CLINIC | Age: 64
End: 2024-10-22
Payer: COMMERCIAL

## 2024-10-22 PROCEDURE — 11042 DBRDMT SUBQ TIS 1ST 20SQCM/<: CPT | Performed by: SURGERY

## 2024-10-22 PROCEDURE — 11042 DBRDMT SUBQ TIS 1ST 20SQCM/<: CPT

## 2024-10-29 ENCOUNTER — APPOINTMENT (OUTPATIENT)
Dept: WOUND CARE | Facility: CLINIC | Age: 64
End: 2024-10-29
Payer: COMMERCIAL

## 2024-11-05 ENCOUNTER — OFFICE VISIT (OUTPATIENT)
Dept: WOUND CARE | Facility: CLINIC | Age: 64
End: 2024-11-05
Payer: COMMERCIAL

## 2024-11-05 PROCEDURE — 11042 DBRDMT SUBQ TIS 1ST 20SQCM/<: CPT | Performed by: SURGERY

## 2024-11-05 PROCEDURE — 11042 DBRDMT SUBQ TIS 1ST 20SQCM/<: CPT

## 2024-11-12 ENCOUNTER — OFFICE VISIT (OUTPATIENT)
Dept: WOUND CARE | Facility: CLINIC | Age: 64
End: 2024-11-12
Payer: COMMERCIAL

## 2024-11-12 PROCEDURE — 11042 DBRDMT SUBQ TIS 1ST 20SQCM/<: CPT | Performed by: SURGERY

## 2024-11-12 PROCEDURE — 11042 DBRDMT SUBQ TIS 1ST 20SQCM/<: CPT

## 2024-11-12 SDOH — ECONOMIC STABILITY: FOOD INSECURITY: WITHIN THE PAST 12 MONTHS, YOU WORRIED THAT YOUR FOOD WOULD RUN OUT BEFORE YOU GOT MONEY TO BUY MORE.: NEVER TRUE

## 2024-11-12 SDOH — ECONOMIC STABILITY: INCOME INSECURITY: HOW HARD IS IT FOR YOU TO PAY FOR THE VERY BASICS LIKE FOOD, HOUSING, MEDICAL CARE, AND HEATING?: NOT HARD AT ALL

## 2024-11-12 SDOH — ECONOMIC STABILITY: FOOD INSECURITY: WITHIN THE PAST 12 MONTHS, THE FOOD YOU BOUGHT JUST DIDN'T LAST AND YOU DIDN'T HAVE MONEY TO GET MORE.: NEVER TRUE

## 2024-11-15 ENCOUNTER — OFFICE VISIT (OUTPATIENT)
Dept: FAMILY MEDICINE CLINIC | Age: 64
End: 2024-11-15

## 2024-11-15 VITALS
OXYGEN SATURATION: 98 % | HEART RATE: 64 BPM | SYSTOLIC BLOOD PRESSURE: 136 MMHG | HEIGHT: 71 IN | WEIGHT: 274.4 LBS | BODY MASS INDEX: 38.42 KG/M2 | DIASTOLIC BLOOD PRESSURE: 80 MMHG | TEMPERATURE: 97.6 F

## 2024-11-15 DIAGNOSIS — I10 PRIMARY HYPERTENSION: ICD-10-CM

## 2024-11-15 DIAGNOSIS — N40.0 BENIGN PROSTATIC HYPERPLASIA WITHOUT LOWER URINARY TRACT SYMPTOMS: ICD-10-CM

## 2024-11-15 RX ORDER — ALUMINUM ZIRCONIUM OCTACHLOROHYDREX GLY 16 G/100G
1 GEL TOPICAL DAILY
COMMUNITY
Start: 2024-10-20

## 2024-11-15 RX ORDER — PHENTERMINE HYDROCHLORIDE 37.5 MG/1
37.5 TABLET ORAL
Qty: 30 TABLET | Refills: 0 | Status: SHIPPED | OUTPATIENT
Start: 2024-11-15 | End: 2024-12-15

## 2024-11-15 RX ORDER — TADALAFIL 10 MG/1
10 TABLET ORAL DAILY
Qty: 30 TABLET | Refills: 2 | Status: SHIPPED | OUTPATIENT
Start: 2024-11-15

## 2024-11-15 NOTE — PROGRESS NOTES
Cade Ham (:  1960) is a 64 y.o. male, Established patient, here for evaluation of the following chief complaint(s):  Weight Management          Subjective   History of Present Illness  The patient presents for evaluation of multiple medical concerns.    He has been inconsistent with his diet and discontinued phentermine in 2024, resulting in a weight gain of 9 pounds. Despite his belief that the medication was not significantly beneficial, he acknowledges a lack of control without it. His physical activity is limited, although he previously attended the gym regularly. He has been consuming protein shakes and reports a decrease in wound size. He is considering resuming phentermine as it initially suppressed his appetite.    He experienced daily pain for three consecutive weeks, which required him to lie down. He consulted Dr. Patel, who prescribed an antibiotic. Since then, he has not experienced any episodes of pain. He is currently taking a probiotic and has been pain-free for the past three months. He suspects a hernia as the cause of his symptoms, which include burping and pain. He is interested in trying a medication to avoid surgery.    He has been monitoring his blood pressure at home, which was recorded as 140/81 yesterday. During a visit to the wound clinic, his blood pressure was 165/97. He is concerned about his blood pressure readings, which are consistently high, and fears they may increase further.    He is experiencing erectile dysfunction and is unsure if it is due to his age or medication. This issue began a year ago when his wife started chemotherapy. Prior to this, he had no problems and was able to engage in sexual activity twice a week. He is considering starting an exercise regimen to improve his strength.    He is currently taking Flomax and has resumed using Flonase, which he finds effective. He previously used Flonase for several months and found it helpful in reducing

## 2024-11-19 ENCOUNTER — OFFICE VISIT (OUTPATIENT)
Dept: WOUND CARE | Facility: CLINIC | Age: 64
End: 2024-11-19
Payer: COMMERCIAL

## 2024-11-19 PROCEDURE — 11042 DBRDMT SUBQ TIS 1ST 20SQCM/<: CPT | Performed by: SURGERY

## 2024-11-19 PROCEDURE — 11042 DBRDMT SUBQ TIS 1ST 20SQCM/<: CPT

## 2024-11-26 ENCOUNTER — OFFICE VISIT (OUTPATIENT)
Dept: WOUND CARE | Facility: CLINIC | Age: 64
End: 2024-11-26
Payer: COMMERCIAL

## 2024-11-26 PROCEDURE — 11042 DBRDMT SUBQ TIS 1ST 20SQCM/<: CPT

## 2024-11-26 PROCEDURE — 11042 DBRDMT SUBQ TIS 1ST 20SQCM/<: CPT | Performed by: SURGERY

## 2025-01-12 DIAGNOSIS — I10 PRIMARY HYPERTENSION: ICD-10-CM

## 2025-01-12 NOTE — TELEPHONE ENCOUNTER
Future Appointments    Encounter Information   Provider Department Appt Notes   4/18/2025 Gerald Gamboa, DO [facility] 5 mo f/u     Past Visits    Date Provider Specialty Visit Type Primary Dx   11/15/2024 Gerald Gamboa, DO Family Medicine Office Visit BMI 39.0-39.9,adult

## 2025-01-13 RX ORDER — LISINOPRIL 5 MG/1
5 TABLET ORAL DAILY
Qty: 90 TABLET | Refills: 3 | Status: SHIPPED | OUTPATIENT
Start: 2025-01-13

## 2025-01-26 RX ORDER — METOPROLOL SUCCINATE 25 MG/1
25 TABLET, EXTENDED RELEASE ORAL DAILY
Qty: 90 TABLET | Refills: 1 | Status: CANCELLED | OUTPATIENT
Start: 2025-01-26

## 2025-01-27 RX ORDER — METOPROLOL SUCCINATE 25 MG/1
25 TABLET, EXTENDED RELEASE ORAL DAILY
Qty: 90 TABLET | Refills: 1 | Status: SHIPPED | OUTPATIENT
Start: 2025-01-27

## 2025-06-10 ENCOUNTER — APPOINTMENT (OUTPATIENT)
Dept: SURGERY | Facility: CLINIC | Age: 65
End: 2025-06-10
Payer: COMMERCIAL

## 2025-06-10 VITALS
BODY MASS INDEX: 37.32 KG/M2 | OXYGEN SATURATION: 98 % | HEART RATE: 60 BPM | HEIGHT: 71 IN | SYSTOLIC BLOOD PRESSURE: 146 MMHG | DIASTOLIC BLOOD PRESSURE: 89 MMHG | WEIGHT: 266.6 LBS | TEMPERATURE: 97.9 F | RESPIRATION RATE: 16 BRPM

## 2025-06-10 DIAGNOSIS — K46.9 ABDOMINAL HERNIA WITHOUT OBSTRUCTION AND WITHOUT GANGRENE, RECURRENCE NOT SPECIFIED, UNSPECIFIED HERNIA TYPE: ICD-10-CM

## 2025-06-10 PROCEDURE — 1036F TOBACCO NON-USER: CPT | Performed by: SURGERY

## 2025-06-10 PROCEDURE — 99213 OFFICE O/P EST LOW 20 MIN: CPT | Performed by: SURGERY

## 2025-06-10 PROCEDURE — 3008F BODY MASS INDEX DOCD: CPT | Performed by: SURGERY

## 2025-06-10 RX ORDER — TAMSULOSIN HYDROCHLORIDE 0.4 MG/1
1 CAPSULE ORAL DAILY
COMMUNITY
Start: 2024-08-12

## 2025-06-10 RX ORDER — METOPROLOL SUCCINATE 25 MG/1
25 TABLET, EXTENDED RELEASE ORAL DAILY
COMMUNITY

## 2025-06-10 RX ORDER — AMITRIPTYLINE HYDROCHLORIDE 10 MG/1
10 TABLET, FILM COATED ORAL NIGHTLY
COMMUNITY
Start: 2025-05-29

## 2025-06-10 RX ORDER — LISINOPRIL 5 MG/1
5 TABLET ORAL DAILY
COMMUNITY

## 2025-06-10 ASSESSMENT — ENCOUNTER SYMPTOMS
HEMATOLOGIC/LYMPHATIC NEGATIVE: 1
APPETITE CHANGE: 0
DYSURIA: 0
SHORTNESS OF BREATH: 0
PALPITATIONS: 0
NAUSEA: 0
WEAKNESS: 0
DIAPHORESIS: 0
DIZZINESS: 0
VOMITING: 0
ABDOMINAL PAIN: 0
BLOOD IN STOOL: 0
PSYCHIATRIC NEGATIVE: 1
CONSTIPATION: 0
ABDOMINAL PAIN: 1
ACTIVITY CHANGE: 0
COUGH: 0
CHILLS: 0
CHEST TIGHTNESS: 0
ENDOCRINE NEGATIVE: 1
CONSTITUTIONAL NEGATIVE: 1
ALLERGIC/IMMUNOLOGIC NEGATIVE: 1
WOUND: 1
HEMATURIA: 0
RESPIRATORY NEGATIVE: 1
LIGHT-HEADEDNESS: 0
DIARRHEA: 0
DIFFICULTY URINATING: 0
RECTAL PAIN: 0
ANAL BLEEDING: 0
FATIGUE: 0
MUSCULOSKELETAL NEGATIVE: 1
FEVER: 0
CARDIOVASCULAR NEGATIVE: 1
NEUROLOGICAL NEGATIVE: 1
UNEXPECTED WEIGHT CHANGE: 0

## 2025-06-10 NOTE — PATIENT INSTRUCTIONS
"Patient Education     Abdominal wall hernias   The Basics   Written by the doctors and editors at [facility]   What is an abdominal wall hernia? -- The internal organs and tissues in the belly are held in place by a tough outer wall of tissue called the \"abdominal wall.\" An abdominal hernia is an area in that wall that is weak or torn. Often, when there is a hernia, organs or tissues that are normally held in place by the abdominal wall bulge or stick out through the weak or torn spot.  The size of the bulge can change depending on how much pressure is put on the abdominal wall. For example, straining when coughing or having a bowel movement can make a hernia look bigger. Lying down overnight can make the hernia look smaller, or even disappear, in the morning.  There are many different kinds of abdominal wall hernias (figure 1).  What are the symptoms of abdominal wall hernias? -- Abdominal wall hernias do not always cause symptoms. When they do, they can cause some or all of these symptoms:  A bulge somewhere on the trunk of the body - This bulge can be so small that you don't even realize it's there.  Pain, especially when coughing, straining, or using nearby muscles  A pulling sensation around the bulge  Nausea or vomiting if part of the intestine is blocked in the hernia  Abdominal wall hernias can balloon out and form a sac. That sac can hold a loop of intestine or a piece of fat that should normally be tucked inside of the belly. This can be painful and even dangerous if the tissue in the hernia gets trapped and unable to slide back into the belly. When this happens, the tissue does not get enough blood, so it can become swollen or even die (figure 2).  Should I see a doctor or nurse? -- Yes. See a doctor or nurse if you have any of the symptoms of a hernia. In most cases, doctors can diagnose a hernia just by doing an exam. During the exam, the doctor might ask you to cough or bear down while pressing on your " "hernia. This might be uncomfortable, but it is necessary to find the source of the problem.  Most of the time, the contents of the hernia can be \"reduced,\" or gently pushed back into the belly. But sometimes, the hernia gets trapped and won't go back in. If that happens, the tissue that is trapped can get damaged.  If you develop severe pain around a hernia bulge or feel sick, call your doctor or surgeon right away.  How are hernias treated? -- Not all hernias need treatment right away. But many do need to be repaired with surgery.  Surgeons can repair most hernias in 1 of 2 ways. The right surgery for you depends on the size of your hernia, where on the abdominal wall it is, whether this is the first time it is getting repaired, what your general health is like, and your surgeon's experience.  The 2 types of surgery are:  Open surgery - During an open surgery, the doctor makes 1 large cut near the hernia to repair it.  Minimally invasive surgery - \"Minimally invasive\" surgery lets the doctor make smaller cuts in the skin. They insert long, thin tools through the cuts. One of the tools has a camera (called a \"laparoscope\") on the end, which sends pictures to a TV screen. The doctor can look at the screen to see inside the body. Then, they use the long tools to do the surgery. They can control the tools directly, or with the help of a robot (this is called \"robot-assisted\" surgery).  If your hernia has reduced the blood supply to a loop of intestine, your doctor might need to remove that piece of intestine. Usually, they will then sew the intestine back together.  The recovery and aftercare for each type of hernia repair is different. Your doctor or nurse can tell you what to expect after your surgery.  All topics are updated as new evidence becomes available and our peer review process is complete.  This topic retrieved from [employer] on: Jan 11, 2024.  Topic 74175 Version 10.0  Release: 31.6.4 - C32.10  © 2024 " UpToDate, Inc. and/or its affiliates. All rights reserved.  figure 1: Abdominal wall hernias     Abdominal wall hernias can happen in different parts of the torso:  Incisional hernias happen along incisions from surgery.  Umbilical hernias happen at the belly button.  Epigastric hernias happen in the midline above the belly button.  Spigelian hernias happen to the left or right of the midline, where 2 layers of muscle meet.  Lumbar hernias (not shown) happen at the back.  Inguinal hernias happen in the groin area.  Femoral hernias happen where the thigh joins the torso.  Graphic 88782 Version 4.0  figure 2: Intestines bulging through hernia     In some cases, a loop of intestine can poke through a hernia. Often, surgeons can push the loop of intestine back in. But if the loop gets trapped or does not get enough blood, surgeons sometimes have to remove it and reconnect the 2 ends of intestine that are left.  Graphic 30833 Version 3.0  Consumer Information Use and Disclaimer   Disclaimer: This generalized information is a limited summary of diagnosis, treatment, and/or medication information. It is not meant to be comprehensive and should be used as a tool to help the user understand and/or assess potential diagnostic and treatment options. It does NOT include all information about conditions, treatments, medications, side effects, or risks that may apply to a specific patient. It is not intended to be medical advice or a substitute for the medical advice, diagnosis, or treatment of a health care provider based on the health care provider's examination and assessment of a patient's specific and unique circumstances. Patients must speak with a health care provider for complete information about their health, medical questions, and treatment options, including any risks or benefits regarding use of medications. This information does not endorse any treatments or medications as safe, effective, or approved for treating a  "specific patient. UpToDate, Inc. and its affiliates disclaim any warranty or liability relating to this information or the use thereof.The use of this information is governed by the Terms of Use, available at https://www.[employer] Storageuwer.com/en/know/clinical-effectiveness-terms. 2024© UpToDate, Inc. and its affiliates and/or licensors. All rights reserved.  Copyright   © 2024 UpToDate, Inc. and/or its affiliates. All rights reserved.          INFORMATION FOR PATIENTS  Dr. Tripathi is a member of the Abdominal Core Health Quality Collaborative ([facility]) registry and routinely follows all of his hernia patients in this way.  Please see the information below. If you receive any email surveys from the [facility], please respond to these so that we can follow how you are doing after surgery.    Please take advantage of the information on the [facility] website regarding rehabilitation before and after abdominal core surgery. This information can be found by going to [email or web address], then by clicking the \"Patients\" heading, then clicking \"Abdominal Core Surgery Rehab,\" or by going directly to [email or web address]    Additionally, there is an [facility] mobile tomas that provides a wealth of information regarding postoperative recovery including stretching, modification for activities of daily living, and exercises for core strength. This can be found by going to the [employer] or Google Play store and searching \"[facility]\".    This information sheet tells you how your surgeon’s participation in the Abdominal Core Health Quality  Collaborative ([facility]) aims to improve care for you and many other patients with hernia disease and diseases of  the abdominal wall or abdominal core. Your surgeon will include your personal health information (such as your  name, birthdate, address, and health care treatment) in the Abdominal [facility] Quality Collaborative  confidential, secure data registry unless you request (as " explained below) that your personal health information  not be included. We hope you will allow your information to be included because doing so will help improve the  quality of care for patients with abdominal core health issues such as hernia, tumors, and infections and help  improve the ways surgeons prevent hernias from forming.     What is the Abdominal Core Health Quality Collaborative ([facility]QC)?  ->The [facility] is a large group of surgeons committed to improving the quality of care for patients who have  abdominal core health problems and often undergo surgery for those problems. These surgeons are also  committed to reducing the chance of abdominal core health problems from forming in the first place.     How does it work?  -> During the routine care of your abdominal core health problem, your surgeon collects information about you  and how you do after surgery as part of your confidential health care records.  -> Your surgeon will input information about you and your surgery into a confidential, secure registry maintained  by the [facility]. Only your surgeon, the [facility] , and, in very limited circumstances, the [place] Food and  Drug Administration (FDA) will have access to any information that identifies you.  -> Your identifiable personal health information (such as your name, birthdate, address, and health care treatment  information) must be entered into the registry in order to make it useful for your surgeon’s and the [facility]’s  quality improvement efforts and so that the scientific validity of registry data can be tested. If available, your  surgeon will also enter your email address, cellular phone number, and mailing address. Your personal health  information, in an anonymous form (meaning your identity cannot be matched to your information), is combined  with anonymous information from hundreds (or thousands) of hernia patients like you so that it can be used by  those interested in improving hernia  care other than your surgeon and the [facility].  -> Your data may be provided to the FDA for the purpose of informing the use of particular medical devices used to  care for patients with abdominal core problems and the prevention of those problems. Data provided to the FDA is  typically submitted in a de-identified form, although occasionally FDA may request access to your confidential  health information under its regulatory authority to monitor public health. This information may not be  completely anonymous; however, it will be kept in a secure location and any public reports or publications that are  generated from the registry for FDA purposes will not contain information that will allow you to be personally  identified. Any release of patient protected health information will only be performed consistent with applicable  local, state and federal laws, regulations and institutional policies.  -> [facility] information, in completely anonymous form, may be used by multiple groups interested in abdominal  core health problems including health care providers, hospitals, industry, insurance companies and researchers.  -> There is no monetary compensation to you for having your information included in the [facility] registry.  -> We anticipate about 9,000 to 12,000 patients’ information being entered into the registry each year, although  the number of patients may increase as more surgeons participate in the registry.  -> The [facility] will keep your personal health information in the registry for at least 15 years to help know what  happens after your hernia operation or other surgery or treatment on a long term basis. When your information is  no longer maintained as part of the registry, it will be deleted in a secure manner consistent with applicable  privacy and patient information requirements. Your personal health information in a de-identified (anonymous)  form may be maintained indefinitely by those who have used the  anonymous registry data.     How does this benefit patients like me?  -> The information gathered from many surgeons and patients is much better than information a single surgeon has  about abdominal core health problems with regards to best technique and best use of mesh for particular patients  and particular abdominal core health issues.  -> This information is used to improve the quality of care by finding the best ways to minimize complications, pain,  and the chance of your problem coming back.  -> Although the [facility] is not a research study, having your information included in the [facility] registry can make  you eligible for future research studies. You may be contacted by your surgeon or the [facility] if you are eligible for  future research studies, and you can find more information on [email or web address].     What are the risks involved?  -> Only your surgeon, the facility where your surgery occurred, the [facility] and in rare instances FDA will have  access to your personal health information input into the registry in an identifiable form. The main risk to you for  having your information entered into the [facility] registry is that your personal health information is  unintentionally disclosed to persons who are not authorized to know your information. Multiple industry-standard  safeguards are in place to prevent this, similar to the safeguards hospitals use to protect confidential health  information during routine care of patients.  -> No system that protects confidential personal health information is 100% effective. In the unlikely event that  your information is exposed, you will be personally notified of the incident and appropriate actions will be taken to  limit potential risks to you.  -> Your participation in the registry is completely voluntary. Not including your information in the [facility] registry  will not have an impact on your care.  -> If you do not wish to have your information included in the [facility] registry  for quality improvement in your  care, please inform your surgeon or contact [facility] (see information, below; If information already has been  collected, it will remain in the registry, but no new information will be collected after 30 days of your notifying  your surgeon or [facility] of your decision not to participate).     What can I do to help?  -> A very important part of your surgeon’s care of you and of the [facility] is finding out how your abdominal core  affects your life before and after surgery.  -> In addition to clinic visits with your surgeon to follow your progress, your surgeon and the [facility] may use  different ways to follow your progress after surgery including phone calls, email, text messages, or regular mail. If  you provide your email address or cellular telephone number, your surgeon and the [facility] may send you emails  or a text messages with a link to a confidential questionnaire for you to complete. You can choose not to receive  emails or text messages as described below. Completed questionnaires become part of the anonymous data that  [facility] uses to improve the quality of care for hernia patients. The email will come from [email or web address]; please don’t  delete or ‘junk filter’ this email.  -> You may be asked to complete a follow up questionnaire a few times over the next several years which helps  your surgeon and the [facility] see how you are doing.     Will I receive email and other electronic communication?  -> Unless you choose not to participate in the [facility] (see below), emails and text messages asking you to complete  an [facility] questionnaire may be sent to you in an unencrypted format and will have no additional personal information  about you, other than possibly that you are a patient of your surgeon (the questionnaire itself will be completed on a  secure portal to the [facility] registry).  -> In deciding if you want unencrypted emails or unencrypted text messages to be sent to you, you should consider  that  communication of protected health information by unencrypted email or unencrypted text message involves  potential privacy and security risks. Specifically, unencrypted email and text message transmissions can be  intercepted in transmission or misdirected, allowing third parties to view the information. You should also consider  that your cellular telephone service provider may impose a data or other charge on you to receive a text message.  You should consider asking that any communication of sensitive information be completed by telephone or mail. If  you do not wish to receive email or text messages concerning the [facility] and questionnaires about your hernia,  please inform your surgeon or contact [facility] (see information, below).     What if I have questions or choose not to participate in the [facility]?  -> Should you have any questions, your surgeon can help provide answers for you. General information, registry  updates, and progress of the [facility] can be found at [email or web address]. You can also send an email to  [email or web address] or call [phone] any time with questions about the registry.  -> If you do not want your personal health information included in the [facility] database or you do not want to  receive emails, text messages, or other communications from [facility], please inform your surgeon or contact  [facility] (by email to [email or web address] or call [phone]).

## 2025-06-10 NOTE — PROGRESS NOTES
"Deon Avila  [id number]   06/10/25  11:48 AM    HPI/Subjective:  Deon Avila is a 65 y.o. male with history of HCC, HTN, obesity who is referred to clinic by Lorenzo Sapp MD for evaluation of a/an ventral hernia(s).    They note a bulge*** which {DOES/ DOES NOT:66374} seem to be increasing in size over time.    Symptomatically, this patient experiences pain {frequency:78552}, which is {COURSE IMPROVING/WORSENING SX HPI:66388}. ***    They have had {SEVERAL/MANY/NO:62646::\"no\"} episode(s) of incarceration***.    They have had {SEVERAL/MANY/NO:14944::\"no\"} episode(s) of obstructive symptoms attributed to the hernia***.    They have had {sjzpriorrepairs:64075}.    Past surgical history is otherwise notable for tonsillectomy, destruction of warts, raul filter placement in IVC, excision of soft tissue tumor (arm)    From a pain history standpoint,  Behavioral health history - None  Opioid substance use - None    From a functional/activity standpoint,  Ability to perform ADLs in 30 days prior to surgery - {sjzfunctionalstatus:97902::\"Independent\"}  Employment - {sjzemployment:18294}  Sporting Activity - {sjzsportingactivity:71967}  BMI - There is no height or weight on file to calculate BMI.    Past medical history is significant for: hepatocellular carcinoma, HTN, obesity    Hepatic insufficiency or liver failure - No  Ascites - No  Hypertension - Yes  Diabetes mellitus - No   Dialysis (acute or chronic renal failure requiring dialysis within 2 weeks prior to surgery) - None  COPD - No  Dyspnea - No  Antiplatelet medications excluding aspirin - No  Anticoagulation medications - No  Aspirin - No  Immunosuppression - No  Nicotine use in relation to OR date - No  History of AAA - No  Collagen vascular disorder - No  Congestive heart failure - No  Active pregnancy - No    Review of Systems   Constitutional:  Negative for activity change, appetite change, chills, diaphoresis, fatigue, fever and unexpected weight " change.   Respiratory:  Negative for cough, chest tightness and shortness of breath.    Cardiovascular:  Negative for palpitations and leg swelling.   Gastrointestinal:  Negative for abdominal pain, anal bleeding, blood in stool, constipation, diarrhea, nausea, rectal pain and vomiting.   Genitourinary:  Negative for difficulty urinating, dysuria and hematuria.   Neurological:  Negative for dizziness, weakness and light-headedness.        Objective:  There is no height or weight on file to calculate BMI.  Physical Exam  Vitals reviewed. Exam conducted with a chaperone present.   Constitutional:       General: He is not in acute distress.     Appearance: Normal appearance. He is not ill-appearing.   HENT:      Head: Normocephalic.      Mouth/Throat:      Mouth: Mucous membranes are moist.   Eyes:      Extraocular Movements: Extraocular movements intact.      Pupils: Pupils are equal, round, and reactive to light.   Cardiovascular:      Rate and Rhythm: Normal rate and regular rhythm.      Pulses: Normal pulses.      Heart sounds: Normal heart sounds. No murmur heard.  Pulmonary:      Effort: Pulmonary effort is normal. No respiratory distress.      Breath sounds: Normal breath sounds. No wheezing, rhonchi or rales.   Chest:      Chest wall: No tenderness.   Abdominal:      General: There is no distension.      Palpations: There is no mass.      Tenderness: There is no abdominal tenderness. There is no guarding or rebound.      Hernia: A hernia is present.   Musculoskeletal:         General: No swelling or deformity.      Cervical back: Neck supple. No rigidity.      Right lower leg: No edema.      Left lower leg: No edema.   Skin:     General: Skin is warm.      Coloration: Skin is not jaundiced or pale.   Neurological:      General: No focal deficit present.      Mental Status: He is alert and oriented to person, place, and time. Mental status is at baseline.   Psychiatric:         Mood and Affect: Mood normal.     "     Behavior: Behavior normal.         Thought Content: Thought content normal.         Judgment: Judgment normal.       No imaging     Assessment/Plan:  Deon Avila is a 65 y.o. male with history of HCC, HTN, obesity who presents with a/an {Desc; symptomatic/asymptomatic:20856} ventral hernia(s), having undergone no prior repairs.    We will plan to {sjznextsteps:14650}.    I will see the patient back in the office in {sjzreturnperiod:71011} for *** or sooner if needed.    The patient {DID/DID NOT:94384::\"did\"} provide consent for surgery and participation in the [facility] at this clinic visit.    Portions of medical record reviewed for pertinent issues including active problem list, medication list, allergies, social history, health maintenance, notes from previous encounters, lab results, and imaging.     Himanshu Tripathi MD  Assistant Professor of Surgery  Division of General Surgery  Department of Surgery  " monitoring.    Himanshu Tripathi MD  Assistant Professor of Surgery  Division of General Surgery  Department of Surgery
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
## 2025-06-18 ASSESSMENT — PATIENT HEALTH QUESTIONNAIRE - PHQ9
SUM OF ALL RESPONSES TO PHQ QUESTIONS 1-9: 0
SUM OF ALL RESPONSES TO PHQ QUESTIONS 1-9: 0
SUM OF ALL RESPONSES TO PHQ9 QUESTIONS 1 & 2: 0
1. LITTLE INTEREST OR PLEASURE IN DOING THINGS: NOT AT ALL
2. FEELING DOWN, DEPRESSED OR HOPELESS: NOT AT ALL
2. FEELING DOWN, DEPRESSED OR HOPELESS: NOT AT ALL
SUM OF ALL RESPONSES TO PHQ QUESTIONS 1-9: 0
1. LITTLE INTEREST OR PLEASURE IN DOING THINGS: NOT AT ALL
SUM OF ALL RESPONSES TO PHQ QUESTIONS 1-9: 0

## 2025-06-20 ENCOUNTER — APPOINTMENT (OUTPATIENT)
Dept: SURGERY | Facility: CLINIC | Age: 65
End: 2025-06-20
Payer: COMMERCIAL

## 2025-06-20 VITALS
SYSTOLIC BLOOD PRESSURE: 126 MMHG | DIASTOLIC BLOOD PRESSURE: 79 MMHG | HEART RATE: 66 BPM | WEIGHT: 270.8 LBS | HEIGHT: 71 IN | BODY MASS INDEX: 37.91 KG/M2

## 2025-06-20 DIAGNOSIS — K43.9 VENTRAL HERNIA WITHOUT OBSTRUCTION OR GANGRENE: ICD-10-CM

## 2025-06-20 DIAGNOSIS — K46.9 ABDOMINAL HERNIA WITHOUT OBSTRUCTION AND WITHOUT GANGRENE, RECURRENCE NOT SPECIFIED, UNSPECIFIED HERNIA TYPE: ICD-10-CM

## 2025-06-20 PROCEDURE — 3008F BODY MASS INDEX DOCD: CPT | Performed by: SURGERY

## 2025-06-20 PROCEDURE — 1036F TOBACCO NON-USER: CPT | Performed by: SURGERY

## 2025-06-20 PROCEDURE — 99205 OFFICE O/P NEW HI 60 MIN: CPT | Performed by: SURGERY

## 2025-06-22 SDOH — ECONOMIC STABILITY: INCOME INSECURITY: IN THE LAST 12 MONTHS, WAS THERE A TIME WHEN YOU WERE NOT ABLE TO PAY THE MORTGAGE OR RENT ON TIME?: NO

## 2025-06-22 SDOH — ECONOMIC STABILITY: FOOD INSECURITY: WITHIN THE PAST 12 MONTHS, YOU WORRIED THAT YOUR FOOD WOULD RUN OUT BEFORE YOU GOT MONEY TO BUY MORE.: NEVER TRUE

## 2025-06-22 SDOH — ECONOMIC STABILITY: FOOD INSECURITY: WITHIN THE PAST 12 MONTHS, THE FOOD YOU BOUGHT JUST DIDN'T LAST AND YOU DIDN'T HAVE MONEY TO GET MORE.: NEVER TRUE

## 2025-06-22 SDOH — ECONOMIC STABILITY: TRANSPORTATION INSECURITY
IN THE PAST 12 MONTHS, HAS THE LACK OF TRANSPORTATION KEPT YOU FROM MEDICAL APPOINTMENTS OR FROM GETTING MEDICATIONS?: NO

## 2025-06-24 ENCOUNTER — OFFICE VISIT (OUTPATIENT)
Age: 65
End: 2025-06-24
Payer: COMMERCIAL

## 2025-06-24 ENCOUNTER — TELEPHONE (OUTPATIENT)
Age: 65
End: 2025-06-24

## 2025-06-24 VITALS
HEIGHT: 71 IN | HEART RATE: 76 BPM | BODY MASS INDEX: 37.94 KG/M2 | DIASTOLIC BLOOD PRESSURE: 70 MMHG | OXYGEN SATURATION: 96 % | WEIGHT: 271 LBS | SYSTOLIC BLOOD PRESSURE: 124 MMHG | TEMPERATURE: 97.8 F

## 2025-06-24 DIAGNOSIS — R63.5 WEIGHT GAIN: ICD-10-CM

## 2025-06-24 DIAGNOSIS — R53.82 CHRONIC FATIGUE: ICD-10-CM

## 2025-06-24 DIAGNOSIS — E66.813 CLASS 3 OBESITY (HCC): ICD-10-CM

## 2025-06-24 DIAGNOSIS — E61.1 IRON DEFICIENCY: ICD-10-CM

## 2025-06-24 DIAGNOSIS — R53.82 CHRONIC FATIGUE: Primary | ICD-10-CM

## 2025-06-24 DIAGNOSIS — F51.12 INSUFFICIENT SLEEP SYNDROME: ICD-10-CM

## 2025-06-24 LAB
ALBUMIN SERPL-MCNC: 4.3 G/DL (ref 3.5–4.6)
ALP SERPL-CCNC: 67 U/L (ref 35–104)
ALT SERPL-CCNC: 20 U/L (ref 0–41)
ANION GAP SERPL CALCULATED.3IONS-SCNC: 14 MEQ/L (ref 9–15)
AST SERPL-CCNC: 20 U/L (ref 0–40)
BASOPHILS # BLD: 0 K/UL (ref 0–0.2)
BASOPHILS NFR BLD: 0.3 %
BILIRUB SERPL-MCNC: 0.3 MG/DL (ref 0.2–0.7)
BUN SERPL-MCNC: 22 MG/DL (ref 8–23)
CALCIUM SERPL-MCNC: 9.3 MG/DL (ref 8.5–9.9)
CHLORIDE SERPL-SCNC: 103 MEQ/L (ref 95–107)
CO2 SERPL-SCNC: 24 MEQ/L (ref 20–31)
CREAT SERPL-MCNC: 0.99 MG/DL (ref 0.7–1.2)
CRP SERPL HS-MCNC: <3 MG/L (ref 0–5)
EOSINOPHIL # BLD: 0.1 K/UL (ref 0–0.7)
EOSINOPHIL NFR BLD: 1.6 %
ERYTHROCYTE [DISTWIDTH] IN BLOOD BY AUTOMATED COUNT: 14.3 % (ref 11.5–14.5)
ERYTHROCYTE [SEDIMENTATION RATE] IN BLOOD BY WESTERGREN METHOD: 8 MM (ref 0–20)
GLOBULIN SER CALC-MCNC: 2.9 G/DL (ref 2.3–3.5)
GLUCOSE SERPL-MCNC: 97 MG/DL (ref 70–99)
HCT VFR BLD AUTO: 44 % (ref 42–52)
HGB BLD-MCNC: 14.2 G/DL (ref 14–18)
LYMPHOCYTES # BLD: 1.6 K/UL (ref 1–4.8)
LYMPHOCYTES NFR BLD: 23.8 %
MCH RBC QN AUTO: 27.3 PG (ref 27–31.3)
MCHC RBC AUTO-ENTMCNC: 32.3 % (ref 33–37)
MCV RBC AUTO: 84.5 FL (ref 79–92.2)
MONOCYTES # BLD: 0.5 K/UL (ref 0.2–0.8)
MONOCYTES NFR BLD: 6.7 %
NEUTROPHILS # BLD: 4.6 K/UL (ref 1.4–6.5)
NEUTS SEG NFR BLD: 67.3 %
PLATELET # BLD AUTO: 163 K/UL (ref 130–400)
POTASSIUM SERPL-SCNC: 4.4 MEQ/L (ref 3.4–4.9)
PROT SERPL-MCNC: 7.2 G/DL (ref 6.3–8)
RBC # BLD AUTO: 5.21 M/UL (ref 4.7–6.1)
SODIUM SERPL-SCNC: 141 MEQ/L (ref 135–144)
TSH REFLEX: 2.41 UIU/ML (ref 0.44–3.86)
WBC # BLD AUTO: 6.8 K/UL (ref 4.8–10.8)

## 2025-06-24 PROCEDURE — 99214 OFFICE O/P EST MOD 30 MIN: CPT | Performed by: FAMILY MEDICINE

## 2025-06-24 RX ORDER — MODAFINIL 200 MG/1
200 TABLET ORAL DAILY
Qty: 30 TABLET | Refills: 2 | Status: SHIPPED | OUTPATIENT
Start: 2025-06-24 | End: 2025-09-22

## 2025-06-24 RX ORDER — AMITRIPTYLINE HYDROCHLORIDE 10 MG/1
10 TABLET ORAL NIGHTLY
COMMUNITY
Start: 2025-05-29

## 2025-06-24 NOTE — PROGRESS NOTES
Cade Ham (:  1960) is a 65 y.o. male, Established patient, here for evaluation of the following chief complaint(s):  Fatigue (C/O fatigue and weakness. States he could take multiple naps throughout the day. He sleeps well at night. He does not feel like he has as much strength as he used to. His wife states he snores. He took a sleep apnea test about 2 years ago and was told he did not have sleep apnea, but has previously used a CPAP machine. ), Weight Gain (He would like to discuss weight loss options. He has tried adipex in the past.), and Other (He is going to see a plastic surgeon for a hernia repair surgery in about a week and a half.)          Subjective   History of Present Illness  The patient presents for evaluation of fatigue, weight management,   he reports persistent fatigue, which he attributes to discontinuing her iron supplementation post-chemotherapy.  daily routine includes breakfast at 6:00 AM, followed by a two-hour nap, lunch, another afternoon nap, and bedtime at 10:30 PM. Despite this sleep pattern, she experiences difficulty lifting objects that were previously manageable. he has been diagnosed with sleep apnea and uses a prescribed medication to maintain wakefulness during outings. he has not participated in aerobics for over a year due to an unhealed wound from a VAC device. he is currently on amitriptyline 10 mg at bedtime for intestinal pain, which he finds effective when taken earlier in the evening. he has tried gabapentin in the past.    he is considering weight loss injections or other medications as a potential solution. She has previously tried Adipex, which suppressed  appetite but was discontinued due to side effects.    he is potentially having hernia surgery. he met with a surgeon last week who has performed 300 to 400 similar surgeries. he is scheduled to meet with a plastic surgeon in a week and a half to discuss the possibility of using tissue from  back to aid
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
## 2025-06-24 NOTE — TELEPHONE ENCOUNTER
Prior Authorization History  modafinil (PROVIGIL) 200 MG tablet     History  PA Detail   View all authorizations for this medication  Closed   6/24/2025  1:30 PM  Close reason: Prior Authorization not required for patient/medication   Note from payer: [facility] has predicted that this prior auth will not be required.   Payer: [employer] Patient's Payer Case ID: [email or web address]    [phone]  Waiting for Payer Response   6/24/2025  1:28 PM  Sending user: Gerald Gamboa DO   Payer: [employer] Patient's Payer    [phone]

## 2025-06-25 ENCOUNTER — RESULTS FOLLOW-UP (OUTPATIENT)
Age: 65
End: 2025-06-25

## 2025-06-25 LAB
ESTIMATED AVERAGE GLUCOSE: 120 MG/DL
HBA1C MFR BLD: 5.8 % (ref 4–6)
IRON % SATURATION: 26 % (ref 20–55)
IRON: 87 UG/DL (ref 61–157)
TOTAL IRON BINDING CAPACITY: 340 UG/DL (ref 250–450)
UNSATURATED IRON BINDING CAPACITY: 253 UG/DL (ref 112–347)

## 2025-06-26 ENCOUNTER — TELEPHONE (OUTPATIENT)
Age: 65
End: 2025-06-26

## 2025-06-27 NOTE — TELEPHONE ENCOUNTER
[facility] called- they need to  have chart notes  Denial letter was faxed yesterday - they will refax (fax was down yesterday)     Fax notes to  [phone]    Any questions please call [phone]

## 2025-07-01 ENCOUNTER — TELEPHONE (OUTPATIENT)
Dept: PLASTIC SURGERY | Facility: CLINIC | Age: 65
End: 2025-07-01
Payer: COMMERCIAL

## 2025-07-07 ENCOUNTER — APPOINTMENT (OUTPATIENT)
Dept: PLASTIC SURGERY | Facility: CLINIC | Age: 65
End: 2025-07-07
Payer: COMMERCIAL

## 2025-07-07 VITALS
HEIGHT: 71 IN | BODY MASS INDEX: 36.68 KG/M2 | DIASTOLIC BLOOD PRESSURE: 86 MMHG | HEART RATE: 70 BPM | RESPIRATION RATE: 16 BRPM | WEIGHT: 262 LBS | SYSTOLIC BLOOD PRESSURE: 131 MMHG

## 2025-07-07 DIAGNOSIS — Z01.818 PRE-OP EXAM: ICD-10-CM

## 2025-07-07 DIAGNOSIS — K43.9 VENTRAL HERNIA WITHOUT OBSTRUCTION OR GANGRENE: ICD-10-CM

## 2025-07-07 DIAGNOSIS — T81.31XA SURGICAL WOUND BREAKDOWN, INITIAL ENCOUNTER: Primary | ICD-10-CM

## 2025-07-07 PROCEDURE — 99203 OFFICE O/P NEW LOW 30 MIN: CPT

## 2025-07-07 PROCEDURE — 3008F BODY MASS INDEX DOCD: CPT

## 2025-07-07 ASSESSMENT — PAIN SCALES - GENERAL: PAINLEVEL_OUTOF10: 0-NO PAIN

## 2025-07-17 RX ORDER — METOPROLOL SUCCINATE 25 MG/1
25 TABLET, EXTENDED RELEASE ORAL DAILY
Qty: 90 TABLET | Refills: 1 | Status: SHIPPED | OUTPATIENT
Start: 2025-07-17

## 2025-09-03 DIAGNOSIS — E66.813 CLASS 3 OBESITY (HCC): ICD-10-CM

## 2025-09-03 RX ORDER — TIRZEPATIDE 2.5 MG/.5ML
INJECTION, SOLUTION SUBCUTANEOUS
Qty: 6 ML | Refills: 0 | Status: ACTIVE | OUTPATIENT
Start: 2025-09-03

## 2025-09-12 ENCOUNTER — APPOINTMENT (OUTPATIENT)
Dept: SURGERY | Facility: CLINIC | Age: 65
End: 2025-09-12
Payer: COMMERCIAL